# Patient Record
Sex: FEMALE | Race: WHITE | NOT HISPANIC OR LATINO | URBAN - METROPOLITAN AREA
[De-identification: names, ages, dates, MRNs, and addresses within clinical notes are randomized per-mention and may not be internally consistent; named-entity substitution may affect disease eponyms.]

---

## 2024-08-02 ENCOUNTER — OFFICE (OUTPATIENT)
Dept: URBAN - METROPOLITAN AREA CLINIC 76 | Facility: CLINIC | Age: 34
End: 2024-08-02
Payer: COMMERCIAL

## 2024-08-02 VITALS
OXYGEN SATURATION: 100 % | SYSTOLIC BLOOD PRESSURE: 132 MMHG | WEIGHT: 143 LBS | DIASTOLIC BLOOD PRESSURE: 84 MMHG | HEIGHT: 64 IN | HEART RATE: 91 BPM

## 2024-08-02 DIAGNOSIS — K59.00 CONSTIPATION, UNSPECIFIED: ICD-10-CM

## 2024-08-02 DIAGNOSIS — R10.11 RIGHT UPPER QUADRANT PAIN: ICD-10-CM

## 2024-08-02 DIAGNOSIS — K21.9 GASTRO-ESOPHAGEAL REFLUX DISEASE WITHOUT ESOPHAGITIS: ICD-10-CM

## 2024-08-02 PROCEDURE — 99204 OFFICE O/P NEW MOD 45 MIN: CPT | Performed by: INTERNAL MEDICINE

## 2024-08-02 RX ORDER — PANTOPRAZOLE SODIUM 40 MG/1
TABLET, DELAYED RELEASE ORAL
Qty: 30 | Refills: 6 | Status: ACTIVE
Start: 2024-08-02

## 2025-03-05 ENCOUNTER — OFFICE VISIT (OUTPATIENT)
Dept: FAMILY MEDICINE CLINIC | Facility: CLINIC | Age: 35
End: 2025-03-05
Payer: COMMERCIAL

## 2025-03-05 VITALS
WEIGHT: 140.8 LBS | HEIGHT: 64 IN | OXYGEN SATURATION: 100 % | BODY MASS INDEX: 24.04 KG/M2 | HEART RATE: 105 BPM | SYSTOLIC BLOOD PRESSURE: 124 MMHG | DIASTOLIC BLOOD PRESSURE: 62 MMHG

## 2025-03-05 DIAGNOSIS — R19.7 DIARRHEA, UNSPECIFIED TYPE: ICD-10-CM

## 2025-03-05 DIAGNOSIS — K21.9 GASTROESOPHAGEAL REFLUX DISEASE, UNSPECIFIED WHETHER ESOPHAGITIS PRESENT: ICD-10-CM

## 2025-03-05 DIAGNOSIS — R11.2 NAUSEA AND VOMITING, UNSPECIFIED VOMITING TYPE: Primary | ICD-10-CM

## 2025-03-05 DIAGNOSIS — R10.11 RUQ ABDOMINAL PAIN: ICD-10-CM

## 2025-03-05 DIAGNOSIS — R10.13 EPIGASTRIC ABDOMINAL PAIN: ICD-10-CM

## 2025-03-05 DIAGNOSIS — G43.109 MIGRAINE WITH AURA AND WITHOUT STATUS MIGRAINOSUS, NOT INTRACTABLE: ICD-10-CM

## 2025-03-05 PROCEDURE — 99204 OFFICE O/P NEW MOD 45 MIN: CPT | Performed by: FAMILY MEDICINE

## 2025-03-05 RX ORDER — VONOPRAZAN FUMARATE 26.72 MG/1
20 TABLET ORAL DAILY
Start: 2025-03-05

## 2025-03-05 RX ORDER — ONDANSETRON 4 MG/1
TABLET, ORALLY DISINTEGRATING ORAL
COMMUNITY
Start: 2024-11-29 | End: 2025-03-05 | Stop reason: SDUPTHER

## 2025-03-05 RX ORDER — DAPSONE 50 MG/G
GEL TOPICAL
COMMUNITY

## 2025-03-05 RX ORDER — FLUTICASONE PROPIONATE 50 MCG
SPRAY, SUSPENSION (ML) NASAL
COMMUNITY

## 2025-03-05 RX ORDER — RIZATRIPTAN BENZOATE 10 MG/1
10 TABLET, ORALLY DISINTEGRATING ORAL DAILY PRN
Qty: 10 TABLET | Refills: 5 | Status: SHIPPED | OUTPATIENT
Start: 2025-03-05

## 2025-03-05 RX ORDER — ONDANSETRON 4 MG/1
4 TABLET, ORALLY DISINTEGRATING ORAL EVERY 8 HOURS PRN
Qty: 24 TABLET | Refills: 5 | Status: SHIPPED | OUTPATIENT
Start: 2025-03-05

## 2025-03-05 NOTE — PROGRESS NOTES
Subjective   Kira Ruiz is a 34 y.o. female. Presents today for   Chief Complaint   Patient presents with    GI Problem     Vomits when eating fried foods    Migraine    Ear Fullness       New patient here to establish care    Abdominal Pain  This is a chronic problem. Episode onset: off/on 10 years. The problem occurs intermittently. The problem has been unchanged. The pain is severe. The quality of the pain is colicky, aching and cramping. The abdominal pain radiates to the RUQ. Associated symptoms include diarrhea (NOt recently), headaches, nausea, vomiting and weight loss. Pertinent negatives include no belching, fever, flatus, hematochezia or melena. The pain is aggravated by eating (greasy foods - especially deep fried). Treatments tried: zofran helps nausea. Prior diagnostic workup includes GI consult, upper endoscopy, CT scan and ultrasound (HIDA scan). Her past medical history is significant for GERD. No fam hx of UC/Crohns   Heartburn  She complains of abdominal pain, globus sensation, heartburn (more indigestion) and nausea. She reports no belching, no chest pain, no dysphagia or no sore throat. This is a chronic problem. The problem occurs frequently. The problem has been unchanged. The heartburn is located in the RUQ. The heartburn is of severe intensity. The symptoms are aggravated by certain foods. Associated symptoms include weight loss. Pertinent negatives include no melena. Risk factors include hiatal hernia (relates told hiatal hernia on EGD 4/2024 done by BS). She has tried a PPI (dexilant helped most, but not completely) for the symptoms. The treatment provided no relief. Past procedures include an EGD.   Diarrhea   This is a recurrent problem. Associated symptoms include abdominal pain, headaches, vomiting and weight loss. Pertinent negatives include no chills, fever or increased  flatus. Treatments tried: this has stopped, now just nausea and vomiting. There is no history of inflammatory  bowel disease. No fam hx of UC/Crohns   Headache  Headache pattern: Happening around periods now.  Did discuss with Gyn stopped OCP as has auras with and inreased risks for stroke.  couple a month;  Excedrin helps;  Imitrex no relief;  Imitrex made feel worse.  Other factors:  Gets scotomatas, auras and vision changes with;  Not disabling to life, having couple a month.     Past week clear congestion/runny nose;   no f/c;  No sorethroat;  Having b/l ear fullness; no pain    Relates surgeon mentioned could take GB out, but not 100% on cause since Hida and RUQ us negative.      Review of Systems   Constitutional:  Positive for weight loss. Negative for chills and fever.   HENT:  Positive for congestion, ear pain, postnasal drip and rhinorrhea. Negative for sore throat.    Respiratory:  Negative for shortness of breath.    Cardiovascular:  Negative for chest pain and palpitations.   Gastrointestinal:  Positive for abdominal pain, diarrhea (NOt recently), heartburn (more indigestion), nausea and vomiting. Negative for anal bleeding, blood in stool, dysphagia, flatus, hematochezia and melena.   Neurological:  Positive for headaches. Negative for syncope.       There is no problem list on file for this patient.    Past Medical History:   Diagnosis Date    Diverticulosis 2024    GERD (gastroesophageal reflux disease) 1992    Headache 2024    Migraines with aura     History reviewed. No pertinent surgical history.  Family History   Problem Relation Age of Onset    Asthma Mother     Cancer Mother         Squamous cell skin cancer    Diabetes Mother     Hyperlipidemia Mother     Cancer Father         Leukemia    Hyperlipidemia Father     Vision loss Maternal Grandmother         Macular degeneration    Cancer Paternal Grandfather         Multiplemyloma    Cancer Paternal Grandmother         Polycythemiavera    Cancer Sister         Melanoma (@age 12)    Diabetes Paternal Uncle      Social History     Socioeconomic History     "Marital status:    Tobacco Use    Smoking status: Never    Smokeless tobacco: Never   Vaping Use    Vaping status: Never Used   Substance and Sexual Activity    Alcohol use: Yes     Comment: Occasionally    Sexual activity: Yes     Partners: Male     Birth control/protection: None     Comment: Taken off BC due to aura migraines increasing stroke risk       Allergies   Allergen Reactions    Iodine Dermatitis    Bacitracin-Polymyxin B Rash    Iodinated Contrast Media Rash    [place] Rash       Current Outpatient Medications on File Prior to Visit   Medication Sig Dispense Refill    Dapsone (Aczone) 5 % topical gel       fluticasone (Flonase Allergy Relief) 50 MCG/ACT nasal spray       ondansetron ODT (ZOFRAN-ODT) 4 MG disintegrating tablet        No current facility-administered medications on file prior to visit.       Objective   Vitals:    03/05/25 1027   BP: 124/62   Pulse: 105   SpO2: 100%   Weight: 63.9 kg (140 lb 12.8 oz)   Height: 162.6 cm (64\")     Body mass index is 24.17 kg/m².  Physical Exam  Vitals and nursing note reviewed.   Constitutional:       Appearance: She is well-developed.   HENT:      Head: Normocephalic and atraumatic.      Right Ear: Tympanic membrane normal.      Left Ear: Tympanic membrane normal.   Neck:      Thyroid: No thyromegaly.      Vascular: No JVD.   Cardiovascular:      Rate and Rhythm: Normal rate and regular rhythm.      Heart sounds: Normal heart sounds. No murmur heard.     No friction rub. No gallop.   Pulmonary:      Effort: Pulmonary effort is normal. No respiratory distress.      Breath sounds: Normal breath sounds. No wheezing or rales.   Abdominal:      General: Bowel sounds are normal. There is no distension.      Palpations: Abdomen is soft.      Tenderness: There is abdominal tenderness in the right upper quadrant and epigastric area. There is no guarding or rebound.      Comments: Questionable murphys, states hurts but inspiration did not arrest due to pain "   Musculoskeletal:      Cervical back: Neck supple.   Skin:     General: Skin is warm and dry.   Neurological:      Mental Status: She is alert.      Gait: Gait normal.   Psychiatric:         Behavior: Behavior normal.       Results  Laboratory Studies  Cervical biopsy on 10/28/2024 showed benign endocervical glandular tissue with chronic cervicitis, negative for intraepithelial lesion. Transformation zone is not identified. Endocervical curetting fragments of benign endocervical glandular epithelium, negative for intraepithelial lesion. Point-of-care hCG was negative.  Surgical pathology for endoscopy with duodenal biopsy, antral biopsy, GE junction at 37 cm. Small bowel, duodenal biopsy, small bowel mucosa with preserved villous architecture and no increase in intraepithelial lymphocytes, negative for dysplasia or malignancy. Stomach, antrum biopsy, gastric mucosa with changes consistent with chemical gastropathy, no Helicobacter or organisms visualized on routine stain. Negative for intestinal metaplasia, dysplasia or malignancy. Gastroesophageal junction 37 cm biopsy, gastric type mucosa with chronic inactive inflammation and foveolar hyperplasia. No squamous mucosa identified. Negative for Rivas's mucosa, dysplasia or malignancy. This was on 04/16/2024.  BMP on 03/13/2024 noted BUN 7, creatinine 0.68, sodium 139, potassium 4.5.  CBC on 03/08/2024 noted a white blood cell count 23.5, hemoglobin 16.8, hematocrit 50.1 on differential was predominantly neutrophils.  CMP on 03/08/2024 noted BUN 9, creatinine 0.9, LFTs normal.  Lipase on 03/08/2024 was 38.  Urinalysis with culture noted positive ketones on dipstick, otherwise negative.    Imaging  CT abdomen and pelvis on 04/22/2024 was negative. No nephrolithiasis, no hydronephrosis. Gallbladder was present. No calcified gallstones or gallbladder wall thickening noted. Incidentally noted was diverticulosis without acute diverticular inflammation.  Ultrasound  gallbladder done on 04/09/2024 was negative right upper quadrant ultrasound.  HIDA scan was done on 03/21/2024 and noted normal hepatobiliary scan no evidence of cholecystitis or bile duct obstruction. Normal gallbladder contraction with CCK ejection fraction measures 92%.  HIDA scan done 05/15/2019 although the gallbladder ejection fraction is normal, the patient's presenting symptoms were reproduced with CCK administration, this suggest normal kinetic biliary dyskinesia at that time.     Assessment & Plan   Diagnoses and all orders for this visit:    1. Nausea and vomiting, unspecified vomiting type (Primary)  -     ondansetron ODT (ZOFRAN-ODT) 4 MG disintegrating tablet; Place 1 tablet on the tongue Every 8 (Eight) Hours As Needed for Nausea or Vomiting.  Dispense: 24 tablet; Refill: 5  -     NM gastric emptying; Future    2. Migraine with aura and without status migrainosus, not intractable  -     rizatriptan MLT (Maxalt-MLT) 10 MG disintegrating tablet; Place 1 tablet on the tongue Daily As Needed for Migraine. May repeat in 2 hours if needed  Dispense: 10 tablet; Refill: 5    3. RUQ abdominal pain  -     CBC & Differential  -     Comprehensive Metabolic Panel  -     Amylase  -     Lipase  -     Celiac Disease Panel  -     C-reactive Protein  -     Sedimentation Rate    4. Gastroesophageal reflux disease, unspecified whether esophagitis present  -     Vonoprazan Fumarate (Voquezna) 20 MG tablet; Take 1 tablet by mouth Daily.    5. Epigastric abdominal pain  -     CBC & Differential  -     Comprehensive Metabolic Panel  -     Amylase  -     Lipase  -     Celiac Disease Panel  -     C-reactive Protein  -     Sedimentation Rate  -     Vonoprazan Fumarate (Voquezna) 20 MG tablet; Take 1 tablet by mouth Daily.  -     NM gastric emptying; Future    6. Diarrhea, unspecified type  -     Celiac Disease Panel  -     C-reactive Protein  -     Sedimentation Rate    Patient with RUQ >epigastric pain with worseing with fatty  foods diff dx gerd/gastritis, biliary colic, pancreatitis and/or gastroparesis;   I will check gastric emptying study and gave #25 voquenza 20mg to take 1 daily see if helps;  Will check full labs;  DIscussed if fails to identify another cause, consider going back to [facility] for consideration of donato yung.     Migraines - infrequent, will give maxalt as will need fail 2 triptans to try newer medications.     Ok zofran prn  Follow low fat diet  Cold symptoms - just symptomatic treatment  To let me know if voquenza helps or not       Assessment & Plan      BMI is within normal parameters. No other follow-up for BMI required.     -Follow up: 6 to 8 weeks and prn

## 2025-03-07 LAB
ALBUMIN SERPL-MCNC: 4.6 G/DL (ref 3.5–5.2)
ALBUMIN/GLOB SERPL: 1.7 G/DL
ALP SERPL-CCNC: 82 U/L (ref 39–117)
ALT SERPL-CCNC: 11 U/L (ref 1–33)
AMYLASE SERPL-CCNC: 78 U/L (ref 28–100)
AST SERPL-CCNC: 16 U/L (ref 1–32)
BASOPHILS # BLD AUTO: 0.03 10*3/MM3 (ref 0–0.2)
BASOPHILS NFR BLD AUTO: 0.5 % (ref 0–1.5)
BILIRUB SERPL-MCNC: 0.4 MG/DL (ref 0–1.2)
BUN SERPL-MCNC: 6 MG/DL (ref 6–20)
BUN/CREAT SERPL: 9 (ref 7–25)
CALCIUM SERPL-MCNC: 9.5 MG/DL (ref 8.6–10.5)
CHLORIDE SERPL-SCNC: 104 MMOL/L (ref 98–107)
CO2 SERPL-SCNC: 25.4 MMOL/L (ref 22–29)
CREAT SERPL-MCNC: 0.67 MG/DL (ref 0.57–1)
CRP SERPL-MCNC: 0.36 MG/DL (ref 0–0.5)
EGFRCR SERPLBLD CKD-EPI 2021: 119.3 ML/MIN/1.73
ENDOMYSIUM IGA SER QL: NEGATIVE
EOSINOPHIL # BLD AUTO: 0.01 10*3/MM3 (ref 0–0.4)
EOSINOPHIL NFR BLD AUTO: 0.2 % (ref 0.3–6.2)
ERYTHROCYTE [DISTWIDTH] IN BLOOD BY AUTOMATED COUNT: 12.1 % (ref 12.3–15.4)
ERYTHROCYTE [SEDIMENTATION RATE] IN BLOOD BY WESTERGREN METHOD: 3 MM/HR (ref 0–20)
GLOBULIN SER CALC-MCNC: 2.7 GM/DL
GLUCOSE SERPL-MCNC: 87 MG/DL (ref 65–99)
HCT VFR BLD AUTO: 44 % (ref 34–46.6)
HGB BLD-MCNC: 14.9 G/DL (ref 12–15.9)
IGA SERPL-MCNC: 169 MG/DL (ref 87–352)
IMM GRANULOCYTES # BLD AUTO: 0.01 10*3/MM3 (ref 0–0.05)
IMM GRANULOCYTES NFR BLD AUTO: 0.2 % (ref 0–0.5)
LIPASE SERPL-CCNC: 38 U/L (ref 13–60)
LYMPHOCYTES # BLD AUTO: 1.43 10*3/MM3 (ref 0.7–3.1)
LYMPHOCYTES NFR BLD AUTO: 24.8 % (ref 19.6–45.3)
MCH RBC QN AUTO: 31.9 PG (ref 26.6–33)
MCHC RBC AUTO-ENTMCNC: 33.9 G/DL (ref 31.5–35.7)
MCV RBC AUTO: 94.2 FL (ref 79–97)
MONOCYTES # BLD AUTO: 0.32 10*3/MM3 (ref 0.1–0.9)
MONOCYTES NFR BLD AUTO: 5.6 % (ref 5–12)
NEUTROPHILS # BLD AUTO: 3.96 10*3/MM3 (ref 1.7–7)
NEUTROPHILS NFR BLD AUTO: 68.7 % (ref 42.7–76)
NRBC BLD AUTO-RTO: 0 /100 WBC (ref 0–0.2)
PLATELET # BLD AUTO: 260 10*3/MM3 (ref 140–450)
POTASSIUM SERPL-SCNC: 4.3 MMOL/L (ref 3.5–5.2)
PROT SERPL-MCNC: 7.3 G/DL (ref 6–8.5)
RBC # BLD AUTO: 4.67 10*6/MM3 (ref 3.77–5.28)
SODIUM SERPL-SCNC: 142 MMOL/L (ref 136–145)
TTG IGA SER-ACNC: <2 U/ML (ref 0–3)
WBC # BLD AUTO: 5.76 10*3/MM3 (ref 3.4–10.8)

## 2025-03-07 NOTE — PROGRESS NOTES
Call results to patient.  Blood counts normal  Kidney, liver and pancreatic function normal  Celiac disease panel negative  Electrolytes and inflammatory markers normal  Recommend proceed with gastric emptying study as dicussed.

## 2025-06-09 ENCOUNTER — OFFICE VISIT (OUTPATIENT)
Dept: FAMILY MEDICINE CLINIC | Facility: CLINIC | Age: 35
End: 2025-06-09
Payer: COMMERCIAL

## 2025-06-09 VITALS
OXYGEN SATURATION: 97 % | DIASTOLIC BLOOD PRESSURE: 86 MMHG | SYSTOLIC BLOOD PRESSURE: 118 MMHG | HEIGHT: 64 IN | BODY MASS INDEX: 24.59 KG/M2 | WEIGHT: 144 LBS | HEART RATE: 92 BPM

## 2025-06-09 DIAGNOSIS — R11.2 NAUSEA AND VOMITING, UNSPECIFIED VOMITING TYPE: Primary | ICD-10-CM

## 2025-06-09 DIAGNOSIS — R00.2 PALPITATIONS: ICD-10-CM

## 2025-06-09 DIAGNOSIS — K58.0 IRRITABLE BOWEL SYNDROME WITH DIARRHEA: ICD-10-CM

## 2025-06-09 DIAGNOSIS — G43.709 CHRONIC MIGRAINE WITHOUT AURA WITHOUT STATUS MIGRAINOSUS, NOT INTRACTABLE: ICD-10-CM

## 2025-06-09 PROCEDURE — 93000 ELECTROCARDIOGRAM COMPLETE: CPT | Performed by: FAMILY MEDICINE

## 2025-06-09 PROCEDURE — 99214 OFFICE O/P EST MOD 30 MIN: CPT | Performed by: FAMILY MEDICINE

## 2025-06-09 RX ORDER — AMITRIPTYLINE HYDROCHLORIDE 10 MG/1
10 TABLET ORAL NIGHTLY
Qty: 30 TABLET | Refills: 5 | Status: SHIPPED | OUTPATIENT
Start: 2025-06-09

## 2025-06-09 NOTE — PROGRESS NOTES
Subjective   Kira Ruiz is a 34 y.o. female. Presents today for   Chief Complaint   Patient presents with    Nausea    Vomiting    Rapid Heart Rate         History of Present Illness  History of Present Illness  The patient is a 34-year-old female who presents for evaluation of abdominal pain, nausea, vomiting, diarrhea, and migraines. She was last seen on 03/05/2025. A gastroparesis study was suggested but deferred due to financial constraints. Instead, treatment was initiated. She was prescribed Zofran for nausea, along with Voquenza and rizatriptan MLT for migraines.    She reports persistent abdominal discomfort, nausea, and diarrhea, although the frequency of diarrhea has decreased. She experiences occasional regurgitation but has not had any episodes of hematemesis. Her weight has remained stable, with a slight increase in recent weeks. She has discontinued Voquenza due to its ineffectiveness and initial side effect of nausea. She reports a sensation of fullness after meals, even when she does not overeat, and occasionally experiences regurgitation. She does not experience sulfur burps but notes that her breath has an unpleasant odor. She has been avoiding fried foods, which has helped alleviate her symptoms. She has been incorporating yogurt and probiotics into her diet. She has a history of gastrointestinal issues since birth, including an upper GI study at two weeks old.    She continues to experience migraines, for which she has only taken medication once, finding more relief with Excedrin.    She has observed an increase in heart rate during physical activity, such as jogging, reaching between 190 and 200 beats per minute, necessitating a reduction in pace. She occasionally experiences visual disturbances but does not report any palpitations at rest or upon waking. She has a known history of tachycardia and is uncertain if this is a new development or a pre-existing condition. She does not have  "Vonnie-Parkinson-White syndrome and has not undergone ablation. She reports dizziness upon standing but has not experienced syncope.  Review of Systems   Constitutional:  Negative for chills, fever and unexpected weight change.   Gastrointestinal:  Positive for abdominal pain, diarrhea, nausea and vomiting. Negative for anal bleeding and blood in stool.   Musculoskeletal:  Positive for myalgias.   Neurological:  Positive for headaches.       There is no problem list on file for this patient.      Social History     Socioeconomic History    Marital status:    Tobacco Use    Smoking status: Never     Passive exposure: Never    Smokeless tobacco: Never   Vaping Use    Vaping status: Never Used   Substance and Sexual Activity    Alcohol use: Yes     Comment: Occasionally    Sexual activity: Yes     Partners: Male     Birth control/protection: None     Comment: Taken off BC due to aura migraines increasing stroke risk       Allergies   Allergen Reactions    Iodine Dermatitis    Bacitracin-Polymyxin B Rash    Iodinated Contrast Media Rash    [place] Rash         Objective   Vitals:    06/09/25 1425 06/09/25 1514 06/09/25 1516 06/09/25 1518   BP: 122/68 110/70 128/80 118/86   BP Location:  Left arm Left arm Left arm   Patient Position:  Lying Sitting Standing   Cuff Size:  Adult Adult Adult   Pulse: 92 75 77 92   SpO2: 98% 97% 99% 97%   Weight: 65.3 kg (144 lb)      Height: 162.6 cm (64\")        Body mass index is 24.72 kg/m².    Physical Exam  Vitals and nursing note reviewed.   Constitutional:       Appearance: She is well-developed.   HENT:      Head: Normocephalic and atraumatic.   Neck:      Thyroid: No thyromegaly.      Vascular: No JVD.   Cardiovascular:      Rate and Rhythm: Normal rate and regular rhythm.      Heart sounds: Normal heart sounds. No murmur heard.     No friction rub. No gallop.   Pulmonary:      Effort: Pulmonary effort is normal. No respiratory distress.      Breath sounds: Normal breath " sounds. No wheezing or rales.   Abdominal:      General: Bowel sounds are normal. There is no distension.      Palpations: Abdomen is soft.      Tenderness: There is no abdominal tenderness. There is no guarding or rebound.   Musculoskeletal:      Cervical back: Neck supple.   Skin:     General: Skin is warm and dry.   Neurological:      Mental Status: She is alert.      Gait: Gait normal.   Psychiatric:         Behavior: Behavior normal.       Physical Exam      ECG 12 Lead    Date/Time: 6/9/2025 3:17 PM  Performed by: Braeden Mann DO    Authorized by: Braeden Mann DO  Comparison: not compared with previous ECG   Previous ECG: no previous ECG available  Rhythm: sinus rhythm  Rate: normal  BPM: 66  Conduction: conduction normal  ST Segments: ST segments normal  T Waves: T waves normal  Other: no other findings  Clinical impression comment: 1) NSR          Results  Labs   - Sed rate: 3   - C-reactive protein: 0.36   - Celiac disease panel: Negative   - Lipase: 38   - Amylase: 78   - CMP: Glucose 87, BUN 6, Creatinine 0.67, Sodium 142, Potassium 4.3, Rest of CMP normal   - White blood cell count: 5.76   - Hemoglobin: 14.9   - Hematocrit: 44.0   - Platelets: 260    Imaging   - CT abdomen with contrast: 04/26/2024, No source of right upper quadrant pain and nausea identified   - Ultrasound gallbladder: 04/09/2024, Negative   - Right upper quadrant ultrasound: 04/09/2024, Negative   - Gallbladder emptying study (HIDA scan): Normal bilateral biliary scan, no evidence of cholecystitis or bile duct obstruction, normal gallbladder and CCK, ejection fraction ratio 92%    Diagnostic Testing   - EGD: 04/16/2024, Small bowel duodenum, small bowel mucosa with preserved villous architecture. No increase in intraepithelial lymphocytes, negative for dysplasia or malignancy. Stomach antrum biopsy showed gastric mucosa with changes consistent with chemical gastropathy. No Helicobacter organisms visualized on routine stain.  Negative for intestinal metaplasia, dysplasia, malignancy. Gastroesophageal junction, 37 cm, biopsy showed gastric type mucosa with chronic inactive inflammation and foveolar hyperplasia, no squamous mucosa identified, negative for Rivas's mucosa, dysplasia, or malignancies     Assessment & Plan   Diagnoses and all orders for this visit:    1. Nausea and vomiting, unspecified vomiting type (Primary)  -     NM gastric emptying; Future    2. Palpitations    Other orders  -     ECG 12 Lead           Assessment & Plan  1. Abdominal pain.  - Symptoms suggest a potential diagnosis of gastroparesis or small intestinal bacterial overgrowth (SIBO). The possibility of biliary colic was also discussed.  - Previous CT abdomen with contrast and ultrasound gallbladder were negative. EGD showed chronic inactive inflammation but no malignancy.  - A gastric emptying study will be ordered to further investigate the cause of symptoms. If the gastric emptying study returns normal, a SIBO test will be considered.  - Advised to avoid fried and fatty foods and to continue monitoring diet.    2. Nausea and vomiting.  - Reports using Zofran (ondansetron) to manage nausea and prevent vomiting.  - Physical exam findings and lab results from previous visit were within normal limits.  - Advised to continue using Zofran as needed.  - Discussed the potential benefit of amitriptyline for symptom management.    3. Diarrhea.  - Reports a decrease in diarrhea frequency.  - Previous lab results showed normal values, including celiac disease panel and CMP.  -will try elavil for IBS with diarrrhea and for  migraine prevention;  call if no relief  4. Migraines.  - Reports limited relief from current migraine medication and prefers using Excedrin.  - - A low dose of amitriptyline will be prescribed to be taken at bedtime to help reduce migraine symptoms. If not tolerated, it will be discontinued.  5. Palpitations  -EKG results are within normal limits,  and orthostatic vitals are stable.  - Advised to keep track of heart rate and blood pressure, and report any changes. If symptomatic episodes occur, a Holter monitor may be considered.  -Consider holter monitor    BMI is within normal parameters. No other follow-up for BMI required.     -Follow up: 2 months and prn     ________________________________________  Braeden Mann DO, MS    Current Outpatient Medications on File Prior to Visit   Medication Sig Dispense Refill    Dapsone (Aczone) 5 % topical gel       fluticasone (Flonase Allergy Relief) 50 MCG/ACT nasal spray       ondansetron ODT (ZOFRAN-ODT) 4 MG disintegrating tablet Place 1 tablet on the tongue Every 8 (Eight) Hours As Needed for Nausea or Vomiting. 24 tablet 5    rizatriptan MLT (Maxalt-MLT) 10 MG disintegrating tablet Place 1 tablet on the tongue Daily As Needed for Migraine. May repeat in 2 hours if needed 10 tablet 5    [DISCONTINUED] Vonoprazan Fumarate (Voquezna) 20 MG tablet Take 1 tablet by mouth Daily.       No current facility-administered medications on file prior to visit.

## 2025-06-12 ENCOUNTER — PATIENT ROUNDING (BHMG ONLY) (OUTPATIENT)
Dept: FAMILY MEDICINE CLINIC | Facility: CLINIC | Age: 35
End: 2025-06-12
Payer: COMMERCIAL

## 2025-06-12 NOTE — PROGRESS NOTES
A DocRun message has been sent to the patient for [place] [facility] [place]

## 2025-06-19 ENCOUNTER — HOSPITAL ENCOUNTER (OUTPATIENT)
Dept: NUCLEAR MEDICINE | Facility: HOSPITAL | Age: 35
Discharge: HOME | End: 2025-06-19
Admitting: FAMILY MEDICINE
Payer: COMMERCIAL

## 2025-06-19 DIAGNOSIS — R11.2 NAUSEA AND VOMITING, UNSPECIFIED VOMITING TYPE: ICD-10-CM

## 2025-06-19 PROCEDURE — A9541 TC99M SULFUR COLLOID: HCPCS | Performed by: FAMILY MEDICINE

## 2025-06-19 PROCEDURE — 34310000005 TECHNETIUM SULFUR COLLOID: Performed by: FAMILY MEDICINE

## 2025-06-19 PROCEDURE — 78264 GASTRIC EMPTYING IMG STUDY: CPT

## 2025-06-19 RX ADMIN — TECHNETIUM TC 99M SULFUR COLLOID 1 DOSE: KIT at 07:58

## 2025-06-27 ENCOUNTER — RESULTS FOLLOW-UP (OUTPATIENT)
Dept: FAMILY MEDICINE CLINIC | Facility: CLINIC | Age: 35
End: 2025-06-27
Payer: COMMERCIAL

## 2025-06-30 RX ORDER — PROPRANOLOL HYDROCHLORIDE 60 MG/1
60 CAPSULE, EXTENDED RELEASE ORAL DAILY
Qty: 30 CAPSULE | Refills: 4 | Status: SHIPPED | OUTPATIENT
Start: 2025-06-30

## 2025-06-30 RX ORDER — LANSOPRAZOLE 30 MG/1
30 CAPSULE, DELAYED RELEASE ORAL DAILY
Qty: 90 CAPSULE | Refills: 1 | Status: SHIPPED | OUTPATIENT
Start: 2025-06-30

## 2025-06-30 RX ORDER — DICYCLOMINE HYDROCHLORIDE 10 MG/1
10 CAPSULE ORAL
Qty: 120 CAPSULE | Refills: 2 | Status: SHIPPED | OUTPATIENT
Start: 2025-06-30

## 2025-06-30 NOTE — TELEPHONE ENCOUNTER
Call results to patient.  Gastric emptying was normal, but if felt pain and nausea;  There is a remote possibility still a gall bladder issue.  There is still a chance has biliary colic (as testing not 100%).  Problem if truly GB is finding surgeon willing to remove, chance if removed was not the cause of pain and also if insurance will cover since testing negative.      In interim, have try/send lansoprazole 30mg 1 po daily #90 1 ref and dicyclomine 120mg 1po qa and  send #120 2 ref - try this for at least 2 weeks and let me know if relieves.       Stop amitriptyline.  Try propranolol LA 60mg 1 po daily, send #30 4 ref and see if migraines subside.    Let know how diarrhea doing as well.       RRJ    Hey Dr Mann, I know you hadn’t been able to look at the results yet but I was just curious when you do look at the results, if you could actually look at the test also? While the test was going on the tech made it seem like my test was abnormal from what she usually sees so this ‘normal’ result was not what I was expecting after that. I was still nauseous at the end of the 4 hours of the test, which made me think there was more in there than what the results say too.      Also, I took the Amitriptyline for a couple of days and I  had a migraine with aura when I took it, along with just did not feel right while taking it. Everyone said I was acting different and not myself, in a negative way, and I did not feel like myself. It’s hard to explain but it was like I was outside of my body trying to act normal because I could tell I was different but I was really just agitated and felt terrible.  NM Gastric Emptying

## 2025-08-26 ENCOUNTER — OFFICE VISIT (OUTPATIENT)
Dept: FAMILY MEDICINE CLINIC | Facility: CLINIC | Age: 35
End: 2025-08-26
Payer: COMMERCIAL

## 2025-08-26 VITALS
HEIGHT: 64 IN | BODY MASS INDEX: 24.75 KG/M2 | DIASTOLIC BLOOD PRESSURE: 72 MMHG | SYSTOLIC BLOOD PRESSURE: 110 MMHG | OXYGEN SATURATION: 98 % | WEIGHT: 145 LBS | HEART RATE: 76 BPM

## 2025-08-26 DIAGNOSIS — G43.009 MIGRAINE WITHOUT AURA AND WITHOUT STATUS MIGRAINOSUS, NOT INTRACTABLE: Primary | ICD-10-CM

## 2025-08-26 PROCEDURE — 99213 OFFICE O/P EST LOW 20 MIN: CPT | Performed by: FAMILY MEDICINE

## 2025-08-26 RX ORDER — NAPROXEN 500 MG/1
500 TABLET ORAL AS NEEDED
COMMUNITY
Start: 2025-04-11

## 2025-08-28 ENCOUNTER — PATIENT ROUNDING (BHMG ONLY) (OUTPATIENT)
Dept: FAMILY MEDICINE CLINIC | Facility: CLINIC | Age: 35
End: 2025-08-28
Payer: COMMERCIAL